# Patient Record
Sex: FEMALE | Race: BLACK OR AFRICAN AMERICAN | NOT HISPANIC OR LATINO | Employment: UNEMPLOYED | URBAN - METROPOLITAN AREA
[De-identification: names, ages, dates, MRNs, and addresses within clinical notes are randomized per-mention and may not be internally consistent; named-entity substitution may affect disease eponyms.]

---

## 2017-01-20 ENCOUNTER — HOSPITAL ENCOUNTER (EMERGENCY)
Facility: HOSPITAL | Age: 27
Discharge: HOME | End: 2017-01-20
Attending: EMERGENCY MEDICINE
Payer: OTHER GOVERNMENT

## 2017-01-20 VITALS
WEIGHT: 200 LBS | HEART RATE: 90 BPM | TEMPERATURE: 98 F | HEIGHT: 69 IN | OXYGEN SATURATION: 100 % | BODY MASS INDEX: 29.62 KG/M2 | SYSTOLIC BLOOD PRESSURE: 116 MMHG | RESPIRATION RATE: 19 BRPM | DIASTOLIC BLOOD PRESSURE: 55 MMHG

## 2017-01-20 DIAGNOSIS — J45.21 MILD INTERMITTENT ASTHMA WITH ACUTE EXACERBATION: Primary | ICD-10-CM

## 2017-01-20 LAB
B-HCG UR QL: NEGATIVE
CTP QC/QA: YES

## 2017-01-20 PROCEDURE — 63600175 PHARM REV CODE 636 W HCPCS: Performed by: NURSE PRACTITIONER

## 2017-01-20 PROCEDURE — 94644 CONT INHLJ TX 1ST HOUR: CPT

## 2017-01-20 PROCEDURE — 81025 URINE PREGNANCY TEST: CPT | Performed by: NURSE PRACTITIONER

## 2017-01-20 PROCEDURE — 25000242 PHARM REV CODE 250 ALT 637 W/ HCPCS: Performed by: NURSE PRACTITIONER

## 2017-01-20 PROCEDURE — 99284 EMERGENCY DEPT VISIT MOD MDM: CPT | Mod: 25

## 2017-01-20 RX ORDER — ALBUTEROL SULFATE 2.5 MG/.5ML
15 SOLUTION RESPIRATORY (INHALATION)
Status: COMPLETED | OUTPATIENT
Start: 2017-01-20 | End: 2017-01-20

## 2017-01-20 RX ORDER — ALBUTEROL SULFATE 0.83 MG/ML
2.5 SOLUTION RESPIRATORY (INHALATION) EVERY 6 HOURS PRN
Qty: 20 EACH | Refills: 0 | Status: SHIPPED | OUTPATIENT
Start: 2017-01-20 | End: 2018-01-20

## 2017-01-20 RX ORDER — PREDNISONE 20 MG/1
40 TABLET ORAL DAILY
Qty: 10 TABLET | Refills: 0 | Status: SHIPPED | OUTPATIENT
Start: 2017-01-20 | End: 2017-01-25

## 2017-01-20 RX ORDER — PREDNISONE 20 MG/1
60 TABLET ORAL
Status: COMPLETED | OUTPATIENT
Start: 2017-01-20 | End: 2017-01-20

## 2017-01-20 RX ORDER — IPRATROPIUM BROMIDE 0.5 MG/2.5ML
1 SOLUTION RESPIRATORY (INHALATION)
Status: COMPLETED | OUTPATIENT
Start: 2017-01-20 | End: 2017-01-20

## 2017-01-20 RX ORDER — ALBUTEROL SULFATE 90 UG/1
1-2 AEROSOL, METERED RESPIRATORY (INHALATION) EVERY 6 HOURS PRN
Qty: 1 INHALER | Refills: 0 | Status: SHIPPED | OUTPATIENT
Start: 2017-01-20 | End: 2018-01-20

## 2017-01-20 RX ADMIN — ALBUTEROL SULFATE 15 MG: 2.5 SOLUTION RESPIRATORY (INHALATION) at 03:01

## 2017-01-20 RX ADMIN — PREDNISONE 60 MG: 20 TABLET ORAL at 03:01

## 2017-01-20 RX ADMIN — IPRATROPIUM BROMIDE 1 MG: 0.5 SOLUTION RESPIRATORY (INHALATION) at 03:01

## 2017-01-20 NOTE — ED PROVIDER NOTES
Encounter Date: 1/20/2017       History     Chief Complaint   Patient presents with    Wheezing     since 8pm last night, reports chest tightness, aubible wheezing, has been out of medications >6 months      Review of patient's allergies indicates:  No Known Allergies  HPI Comments: Chief Complaint: Shortness of breath x6 hours    HPI: This is a 26-year-old female with history of asthma who presents to the ED with complaints of shortness of breath, chest tightness and wheezing for approximately 6 hours.  Patient reports she has not had an asthma exacerbation within the last year.  She has run out of her albuterol nebs.  Patient reports that she recently began smoking again.  Associated symptoms include dry cough.  She denies fever, productive cough.  No attempted treatment at home.  Symptoms are worse with exertion. She is currently breastfeeding.    The history is provided by the patient.     Past Medical History   Diagnosis Date    Asthma     Genital herpes      No past medical history pertinent negatives.  Past Surgical History   Procedure Laterality Date    Eagle tooth extraction       Family History   Problem Relation Age of Onset    Breast cancer Neg Hx     Colon cancer Neg Hx     Ovarian cancer Neg Hx      Social History   Substance Use Topics    Smoking status: Former Smoker    Smokeless tobacco: None    Alcohol use No     Review of Systems   Constitutional: Negative for chills and fever.   HENT: Negative for congestion and sore throat.    Respiratory: Positive for cough, chest tightness, shortness of breath and wheezing.    Cardiovascular: Negative for chest pain.   Gastrointestinal: Negative for abdominal pain, diarrhea, nausea and vomiting.   Genitourinary: Negative for dysuria.   Neurological: Negative for seizures, syncope and headaches.       Physical Exam   Initial Vitals   BP Pulse Resp Temp SpO2   01/20/17 0259 01/20/17 0259 01/20/17 0259 01/20/17 0259 01/20/17 0259   117/83 71 20 97.6 °F  (36.4 °C) 97 %     Physical Exam    Constitutional: Vital signs are normal. She appears well-developed and well-nourished.  Non-toxic appearance.   HENT:   Head: Normocephalic and atraumatic.   Mouth/Throat: Uvula is midline, oropharynx is clear and moist and mucous membranes are normal. No trismus in the jaw. No oropharyngeal exudate or posterior oropharyngeal erythema.   Eyes: EOM are normal.   Neck: Full passive range of motion without pain. Neck supple. No rigidity.   Cardiovascular: Normal rate, S1 normal, S2 normal and normal heart sounds. Exam reveals no gallop.    No murmur heard.  Pulmonary/Chest: Effort normal. No tachypnea. She has no decreased breath sounds. She has wheezes. She has no rhonchi. She has no rales.   Neurological: She is alert and oriented to person, place, and time. She has normal strength. Gait normal. GCS eye subscore is 4. GCS verbal subscore is 5. GCS motor subscore is 6.   Skin: Skin is warm and dry. No rash noted.         ED Course   Procedures  Labs Reviewed   POCT URINE PREGNANCY             Medical Decision Making:   ED Management:  This is a 26-year-old nonpregnant female who presents to the ED with complaints of shortness of breath and wheezing.  She is afebrile and well-appearing.  She is currently breast-feeding.  Vital signs are normal.  On exam, she is wheezing bilaterally and mildly tachypnic.  After receiving DuoNeb's and prednisone, she reports feeling much better and her breath sounds improved.  I considered but suspect a low probability for PE, pneumonia, ACS or other serious etiology.  I will treat for acute asthma exacerbation.  Discharged home with prescriptions for albuterol and prednisone. Instructions given for supportive care and follow-up.  Return precautions given.  Patient's case was discussed with Dr. Almaraz, who agrees with her plan of care.                   ED Course     Clinical Impression:   The encounter diagnosis was Mild intermittent asthma  with acute exacerbation.    Disposition:   Disposition: Discharged  Condition: Stable       Deanna Hong NP  01/20/17 0611

## 2017-01-20 NOTE — DISCHARGE INSTRUCTIONS
Please return to the ED for any new or worsening symptoms: chest pain, shortness of breath, loss of consciousness or any other concerns. Please follow up with primary care within in the week. You may also call [phone] for the [facility] same day appointment line.

## 2017-01-20 NOTE — ED TRIAGE NOTES
Pt presents to ED with c/o wheezing since 2100 last night. Pt reports that she has a hx of asthma and has not a flare up in over a year. She reports that she does not have inhaler at home. Pt reports being a smoker. No distress is noted. will continue to be monitored

## 2017-01-20 NOTE — ED AVS SNAPSHOT
[facility]  [place] [address]               Mica SNELL Justin   2017  3:10 AM   ED    Description:  Female : 1990   Department:  [facility]           Your Care was Coordinated By:     Provider Role From To    Priya Jones MD Attending Provider 17 --    Deanna Hong NP Nurse Practitioner 17 --      Reason for Visit     Wheezing           Diagnoses this Visit        Comments    Mild intermittent asthma with acute exacerbation    -  Primary       ED Disposition     None           To Do List           Follow-up Information     Schedule an appointment as soon as possible for a visit with Srinath Fonseca MD.    Specialty:  Family Medicine    Why:  Primary Care    Contact information:    [place] [address] [phone]         These Medications        Disp Refills Start End    albuterol 90 mcg/actuation inhaler 1 Inhaler 0 2017    Inhale 1-2 puffs into the lungs every 6 (six) hours as needed for Wheezing. - Inhalation    [facility] [place] [facility] [address] Ph #: [phone]       predniSONE (DELTASONE) 20 MG tablet 10 tablet 0 2017    Take 2 tablets (40 mg total) by mouth once daily. - Oral    [facility] [address] Ph #: [phone]       albuterol (PROVENTIL) 2.5 mg /3 mL (0.083 %) nebulizer solution 20 each 0 2017    Take 3 mLs (2.5 mg total) by nebulization every 6 (six) hours as needed for Wheezing. - Nebulization    [facility] [address] Ph #: [phone]         [facility] On [facility] -  Assistance  Registered nurses in the [facility] provide clinical advisement, health education, appointment booking, and other  advisory services.  Call for this free service at [phone].             Medications           Message regarding Medications     Verify the changes and/or additions to your medication regime listed below are the same as discussed with your clinician today.  If any of these changes or additions are incorrect, please notify your healthcare provider.        START taking these NEW medications        Refills    albuterol 90 mcg/actuation inhaler 0    Sig: Inhale 1-2 puffs into the lungs every 6 (six) hours as needed for Wheezing.    Class: Print    Route: Inhalation    predniSONE (DELTASONE) 20 MG tablet 0    Sig: Take 2 tablets (40 mg total) by mouth once daily.    Class: Print    Route: Oral    albuterol (PROVENTIL) 2.5 mg /3 mL (0.083 %) nebulizer solution 0    Sig: Take 3 mLs (2.5 mg total) by nebulization every 6 (six) hours as needed for Wheezing.    Class: Print    Route: Nebulization      These medications were administered today        Dose Freq    albuterol sulfate nebulizer solution 15 mg 15 mg ED 1 Time    Sig: Take 15 mg by nebulization ED 1 Time.    Class: Normal    Route: Nebulization    ipratropium 0.02 % nebulizer solution 1 mg 1 mg ED 1 Time    Sig: Take 5 mLs (1 mg total) by nebulization ED 1 Time.    Class: Normal    Route: Nebulization    predniSONE tablet 60 mg 60 mg ED 1 Time    Sig: Take 3 tablets (60 mg total) by mouth ED 1 Time.    Class: Normal    Route: Oral           Verify that the below list of medications is an accurate representation of the medications you are currently taking.  If none reported, the list may be blank. If incorrect, please contact your healthcare provider. Carry this list with you in case of emergency.           Current Medications     albuterol (PROVENTIL) 2.5 mg /3 mL (0.083 %) nebulizer solution Take 3 mLs (2.5 mg total) by nebulization every 6 (six) hours as needed for Wheezing.    albuterol 90 mcg/actuation inhaler Inhale 1-2 puffs into the lungs every 6 (six)  "hours as needed for Wheezing.    ALBUTEROL INHL Inhale into the lungs. Uses every 4 hours as needed.  Pt left inhaler at home    albuterol sulfate nebulizer solution 15 mg Take 15 mg by nebulization ED 1 Time.    ibuprofen (ADVIL,MOTRIN) 600 MG tablet Take 1 tablet (600 mg total) by mouth every 6 (six) hours.    ondansetron (ZOFRAN-ODT) 8 MG TbDL Take 1 tablet (8 mg total) by mouth every 12 (twelve) hours as needed.    oxycodone-acetaminophen (PERCOCET) 5-325 mg per tablet Take 1 tablet by mouth every 4 (four) hours as needed.    PNV1/IRON,CARBONYL/DOCUSATE/FA (PREN VIT COMB.1-IRON CB-FA-DSS ORAL) Take by mouth.    predniSONE (DELTASONE) 20 MG tablet Take 2 tablets (40 mg total) by mouth once daily.           Clinical Reference Information           Your Vitals Were     BP Pulse Temp Resp Height Weight    117/83 (BP Location: Right arm, Patient Position: Sitting) 98 97.6 °F (36.4 °C) (Oral) 20 5' 9" (1.753 m) 90.7 kg (200 lb)    SpO2 BMI             99% 29.53 kg/m2         Allergies as of 1/20/2017     No Known Allergies      Immunizations Administered on Date of Encounter - 1/20/2017     None      ED Micro, Lab, POCT     Start Ordered       Status Ordering Provider    01/20/17 0301 01/20/17 0300  POCT urine pregnancy  Once      Final result       ED Imaging Orders     None        Discharge Instructions       Please return to the ED for any new or worsening symptoms: chest pain, shortness of breath, loss of consciousness or any other concerns. Please follow up with primary care within in the week. You may also call [phone] for the [facility] same day appointment line.    Discharge References/Attachments     ASTHMA, ACUTE (ADULT) (ENGLISH)    ASTHMA, CONTROLLING YOUR (ENGLISH)      Smoking Cessation     If you would like to quit smoking:   You may be eligible for free services if you are a [place] resident and started smoking cigarettes before September 1, 1988.  Call the [employer]oking [employer]essation [employer] ([employer]) " toll free at (568) 471-0500 or (039) 370-2412.   Call 1-800-QUIT-NOW if you do not meet the above criteria.             [facility] complies with applicable Federal civil rights laws and does not discriminate on the basis of race, color, national origin, age, disability, or sex.        Language Assistance Services     ATTENTION: Language assistance services are available, free of charge. Please call [phone].      ATENCIÓN: Si habla español, tiene a alvarez disposición servicios gratuitos de asistencia lingüística. Llame al [phone].     CHÚ Ý: N?u b?n nói Ti?ng Vi?t, có các d?ch v? h? tr? ngôn ng? mi?n phí dành cho b?n. G?i s? [phone].

## 2017-08-26 ENCOUNTER — HOSPITAL ENCOUNTER (EMERGENCY)
Facility: HOSPITAL | Age: 27
Discharge: LEFT BEFORE DISCHARGE | End: 2017-08-26
Attending: EMERGENCY MEDICINE
Payer: OTHER GOVERNMENT

## 2017-08-26 VITALS
WEIGHT: 160 LBS | HEART RATE: 64 BPM | HEIGHT: 69 IN | BODY MASS INDEX: 23.7 KG/M2 | SYSTOLIC BLOOD PRESSURE: 130 MMHG | DIASTOLIC BLOOD PRESSURE: 65 MMHG | OXYGEN SATURATION: 100 % | RESPIRATION RATE: 20 BRPM | TEMPERATURE: 99 F

## 2017-08-26 DIAGNOSIS — Z53.29 LEFT AGAINST MEDICAL ADVICE: ICD-10-CM

## 2017-08-26 DIAGNOSIS — O20.0 THREATENED MISCARRIAGE IN EARLY PREGNANCY: Primary | ICD-10-CM

## 2017-08-26 LAB
ALBUMIN SERPL BCP-MCNC: 4.1 G/DL
ALP SERPL-CCNC: 141 U/L
ALT SERPL W/O P-5'-P-CCNC: 10 U/L
ANION GAP SERPL CALC-SCNC: 12 MMOL/L
AST SERPL-CCNC: 19 U/L
B-HCG UR QL: POSITIVE
BACTERIA GENITAL QL WET PREP: ABNORMAL
BASOPHILS # BLD AUTO: 0.03 K/UL
BASOPHILS NFR BLD: 0.6 %
BILIRUB SERPL-MCNC: 0.9 MG/DL
BILIRUB UR QL STRIP: NEGATIVE
BUN SERPL-MCNC: 9 MG/DL
CALCIUM SERPL-MCNC: 9.6 MG/DL
CHLORIDE SERPL-SCNC: 106 MMOL/L
CLARITY UR: CLEAR
CLUE CELLS VAG QL WET PREP: ABNORMAL
CO2 SERPL-SCNC: 22 MMOL/L
COLOR UR: YELLOW
CREAT SERPL-MCNC: 0.8 MG/DL
CTP QC/QA: YES
DIFFERENTIAL METHOD: ABNORMAL
EOSINOPHIL # BLD AUTO: 0.2 K/UL
EOSINOPHIL NFR BLD: 3.8 %
ERYTHROCYTE [DISTWIDTH] IN BLOOD BY AUTOMATED COUNT: 14.3 %
EST. GFR  (AFRICAN AMERICAN): >60 ML/MIN/1.73 M^2
EST. GFR  (NON AFRICAN AMERICAN): >60 ML/MIN/1.73 M^2
FILAMENT FUNGI VAG WET PREP-#/AREA: ABNORMAL
GLUCOSE SERPL-MCNC: 86 MG/DL
GLUCOSE UR QL STRIP: NEGATIVE
HCG INTACT+B SERPL-ACNC: NORMAL MIU/ML
HCT VFR BLD AUTO: 38.1 %
HGB BLD-MCNC: 13.6 G/DL
HGB UR QL STRIP: NEGATIVE
KETONES UR QL STRIP: ABNORMAL
LEUKOCYTE ESTERASE UR QL STRIP: NEGATIVE
LYMPHOCYTES # BLD AUTO: 1.7 K/UL
LYMPHOCYTES NFR BLD: 32.7 %
MCH RBC QN AUTO: 27.7 PG
MCHC RBC AUTO-ENTMCNC: 35.7 G/DL
MCV RBC AUTO: 78 FL
MONOCYTES # BLD AUTO: 0.4 K/UL
MONOCYTES NFR BLD: 6.8 %
NEUTROPHILS # BLD AUTO: 3 K/UL
NEUTROPHILS NFR BLD: 55.9 %
NITRITE UR QL STRIP: NEGATIVE
PH UR STRIP: 5 [PH] (ref 5–8)
PLATELET # BLD AUTO: 286 K/UL
PMV BLD AUTO: 10.9 FL
POTASSIUM SERPL-SCNC: 3.9 MMOL/L
PROT SERPL-MCNC: 7.4 G/DL
PROT UR QL STRIP: NEGATIVE
RBC # BLD AUTO: 4.91 M/UL
SODIUM SERPL-SCNC: 140 MMOL/L
SP GR UR STRIP: 1.03 (ref 1–1.03)
SPECIMEN SOURCE: ABNORMAL
T VAGINALIS GENITAL QL WET PREP: ABNORMAL
URN SPEC COLLECT METH UR: ABNORMAL
UROBILINOGEN UR STRIP-ACNC: ABNORMAL EU/DL
WBC # BLD AUTO: 5.32 K/UL
WBC #/AREA VAG WET PREP: ABNORMAL
YEAST GENITAL QL WET PREP: ABNORMAL

## 2017-08-26 PROCEDURE — 85025 COMPLETE CBC W/AUTO DIFF WBC: CPT

## 2017-08-26 PROCEDURE — 84702 CHORIONIC GONADOTROPIN TEST: CPT

## 2017-08-26 PROCEDURE — 63600175 PHARM REV CODE 636 W HCPCS: Performed by: PHYSICIAN ASSISTANT

## 2017-08-26 PROCEDURE — 87210 SMEAR WET MOUNT SALINE/INK: CPT

## 2017-08-26 PROCEDURE — 99284 EMERGENCY DEPT VISIT MOD MDM: CPT | Mod: 25

## 2017-08-26 PROCEDURE — 81003 URINALYSIS AUTO W/O SCOPE: CPT

## 2017-08-26 PROCEDURE — 96361 HYDRATE IV INFUSION ADD-ON: CPT

## 2017-08-26 PROCEDURE — 81025 URINE PREGNANCY TEST: CPT | Performed by: PHYSICIAN ASSISTANT

## 2017-08-26 PROCEDURE — 87086 URINE CULTURE/COLONY COUNT: CPT

## 2017-08-26 PROCEDURE — 96374 THER/PROPH/DIAG INJ IV PUSH: CPT

## 2017-08-26 PROCEDURE — 87591 N.GONORRHOEAE DNA AMP PROB: CPT

## 2017-08-26 PROCEDURE — 80053 COMPREHEN METABOLIC PANEL: CPT

## 2017-08-26 PROCEDURE — 25000003 PHARM REV CODE 250: Performed by: PHYSICIAN ASSISTANT

## 2017-08-26 PROCEDURE — 96375 TX/PRO/DX INJ NEW DRUG ADDON: CPT

## 2017-08-26 RX ORDER — ONDANSETRON 4 MG/1
8 TABLET, FILM COATED ORAL EVERY 12 HOURS PRN
Qty: 12 TABLET | Refills: 0 | Status: SHIPPED | OUTPATIENT
Start: 2017-08-26

## 2017-08-26 RX ORDER — ACETAMINOPHEN 325 MG/1
650 TABLET ORAL
Status: COMPLETED | OUTPATIENT
Start: 2017-08-26 | End: 2017-08-26

## 2017-08-26 RX ORDER — METOCLOPRAMIDE HYDROCHLORIDE 5 MG/ML
10 INJECTION INTRAMUSCULAR; INTRAVENOUS
Status: COMPLETED | OUTPATIENT
Start: 2017-08-26 | End: 2017-08-26

## 2017-08-26 RX ORDER — ONDANSETRON 2 MG/ML
8 INJECTION INTRAMUSCULAR; INTRAVENOUS
Status: COMPLETED | OUTPATIENT
Start: 2017-08-26 | End: 2017-08-26

## 2017-08-26 RX ADMIN — ACETAMINOPHEN 650 MG: 325 TABLET, FILM COATED ORAL at 10:08

## 2017-08-26 RX ADMIN — METOCLOPRAMIDE 10 MG: 5 INJECTION, SOLUTION INTRAMUSCULAR; INTRAVENOUS at 12:08

## 2017-08-26 RX ADMIN — SODIUM CHLORIDE 1000 ML: 0.9 INJECTION, SOLUTION INTRAVENOUS at 10:08

## 2017-08-26 RX ADMIN — ONDANSETRON 8 MG: 2 INJECTION INTRAMUSCULAR; INTRAVENOUS at 10:08

## 2017-08-26 NOTE — ED PROVIDER NOTES
"Encounter Date: 2017       History     Chief Complaint   Patient presents with    Headache     Recent positive pregnancy test two weeks ago and constant headaches, slight vaginal bleeding "with wiping".     Emesis     26-year-old female, A0 0, 6 weeks 4 days pregnant by last menstrual period of , no past medical history, presents to emergency department for 5 day course of nausea, vomiting, right temporal headache, and vaginal bleeding.  Notes 4-5 episodes of emesis per day and is not tolerating by mouth.  Vaginal bleeding is only spotting noted with wiping and is not having to use pads or tampons.  Denies abdominal pain.  No attempted treatment for headaches or nausea.  Patient notes relief with taking Diclegis prior pregnancy.  No visual disturbance, dental pain, fever, or otalgia.  Appointment with Dr. Vilchis next week.          Review of patient's allergies indicates:  No Known Allergies  Past Medical History:   Diagnosis Date    Asthma     Genital herpes      Past Surgical History:   Procedure Laterality Date     SECTION      WISDOM TOOTH EXTRACTION       Family History   Problem Relation Age of Onset    Breast cancer Neg Hx     Colon cancer Neg Hx     Ovarian cancer Neg Hx      Social History   Substance Use Topics    Smoking status: Former Smoker    Smokeless tobacco: Never Used    Alcohol use No     Review of Systems   Constitutional: Negative for fever.   HENT: Negative for sore throat.    Eyes: Negative for visual disturbance.   Respiratory: Negative for cough and shortness of breath.    Cardiovascular: Negative for chest pain.   Gastrointestinal: Positive for nausea and vomiting. Negative for abdominal pain.   Genitourinary: Negative for dysuria, frequency, pelvic pain, urgency, vaginal bleeding and vaginal discharge.   Musculoskeletal: Negative for back pain.   Skin: Negative for rash.   Neurological: Positive for headaches. Negative for dizziness, seizures and syncope. "   All other systems reviewed and are negative.      Physical Exam     Initial Vitals [08/26/17 0941]   BP Pulse Resp Temp SpO2   134/78 88 17 98.7 °F (37.1 °C) 100 %      MAP       96.67         Physical Exam    Nursing note and vitals reviewed.  Constitutional: She appears well-developed and well-nourished. She is not diaphoretic. No distress.   HENT:   Head: Normocephalic and atraumatic.   Nose: Nose normal.   Mouth/Throat: Oropharynx is clear and moist.   Eyes: Conjunctivae and EOM are normal. Right eye exhibits no discharge. Left eye exhibits no discharge.   Neck: Normal range of motion. No tracheal deviation present. No JVD present.   Cardiovascular: Normal rate, regular rhythm and normal heart sounds. Exam reveals no friction rub.    No murmur heard.  Pulmonary/Chest: Breath sounds normal. No stridor. No respiratory distress. She has no wheezes. She has no rhonchi. She has no rales. She exhibits no tenderness.   Abdominal: Soft. She exhibits no distension. There is no tenderness. There is no rigidity, no rebound, no guarding, no CVA tenderness, no tenderness at McBurney's point and negative Espinal's sign.   Genitourinary: Pelvic exam was performed with patient supine. There is no rash, tenderness or lesion on the right labia. There is no rash, tenderness or lesion on the left labia. Cervix exhibits no motion tenderness, no discharge and no friability. Right adnexum displays no mass and no tenderness. Left adnexum displays no mass and no tenderness. No erythema, tenderness or bleeding in the vagina. No foreign body in the vagina. No signs of injury around the vagina. Vaginal discharge (scant thin white) found.   Genitourinary Comments: Os closed   Musculoskeletal: Normal range of motion.   Neurological: She is alert and oriented to person, place, and time.   Skin: Skin is warm and dry. No rash and no abscess noted. No erythema. No pallor.         ED Course   Procedures  Labs Reviewed   CBC W/ AUTO DIFFERENTIAL  - Abnormal; Notable for the following:        Result Value    MCV 78 (*)     All other components within normal limits   COMPREHENSIVE METABOLIC PANEL - Abnormal; Notable for the following:     CO2 22 (*)     Alkaline Phosphatase 141 (*)     All other components within normal limits   URINALYSIS - Abnormal; Notable for the following:     Ketones, UA 1+ (*)     Urobilinogen, UA 2.0-3.0 (*)     All other components within normal limits   VAGINAL SCREEN - Abnormal; Notable for the following:     Clue Cells, Wet Prep Few (*)     WBC - Vaginal Screen Few (*)     Bacteria - Vaginal Screen Few (*)     All other components within normal limits   POCT URINE PREGNANCY - Abnormal; Notable for the following:     POC Preg Test, Ur Positive (*)     All other components within normal limits   C. TRACHOMATIS/N. GONORRHOEAE BY AMP DNA   CULTURE, URINE   HCG, QUANTITATIVE, PREGNANCY             Medical Decision Making:   History:   Old Medical Records: I decided to obtain old medical records.      This is an emergent evaluation of a 26 y.o. female, A0, 6 weeks pregnant (by LMP), with no PMHx presenting to the ED for vaginal bleeding associated with HA and emesis. Denies abdominal pain and weakness. Vitals WNL, afebrile. Patient is non-toxic appearing and in no acute distress. Pelvic exam shows os closed. UPT positive. ABO A (+). Beta-hCG 12,751 today, with no previous value to compare to. CBC and CMP reveal no evidence to suggest HELLP syndrome in this first trimester patient. UA unremarkable. Vaginal screen unremarkable. GC pending.     Patient decides to leave [place] before US can be performed. Patient does not want to wait. Patient likely has threatened miscarriage, but EP cannot be excluded without US. Patient understands risks and understands she may return at anytime to continue evaluation for acute surgical process. I have also considered but doubt and UTI/pyelonephritis, ovarian torsion, ovarian cyst rupture, appendicitis,  PID, and salpingitis.     I discussed this patient with Dr. Mo who is in agreement with my assessment and plan.                 ED Course     Clinical Impression:   The primary encounter diagnosis was Threatened miscarriage in early pregnancy. A diagnosis of Left against medical advice was also pertinent to this visit.    Disposition:   Disposition: AMA  Condition: Fair                        Manohar Davis PA-C  08/26/17 1514

## 2017-08-26 NOTE — ED NOTES
Spoke with one of the Ultrasound techs. Stated the US tech to perform her ultrasound is with a patient, and it may be another 30-40 minutes before she comes to get her

## 2017-08-28 LAB
BACTERIA UR CULT: NORMAL
C TRACH DNA SPEC QL NAA+PROBE: NOT DETECTED
N GONORRHOEA DNA SPEC QL NAA+PROBE: NOT DETECTED

## 2017-09-16 ENCOUNTER — HOSPITAL ENCOUNTER (EMERGENCY)
Facility: HOSPITAL | Age: 27
Discharge: HOME | End: 2017-09-16
Attending: EMERGENCY MEDICINE
Payer: OTHER GOVERNMENT

## 2017-09-16 VITALS
RESPIRATION RATE: 18 BRPM | BODY MASS INDEX: 31.4 KG/M2 | DIASTOLIC BLOOD PRESSURE: 82 MMHG | HEIGHT: 69 IN | HEART RATE: 88 BPM | WEIGHT: 212 LBS | SYSTOLIC BLOOD PRESSURE: 128 MMHG | TEMPERATURE: 98 F | OXYGEN SATURATION: 99 %

## 2017-09-16 DIAGNOSIS — M54.42 ACUTE BILATERAL LOW BACK PAIN WITH BILATERAL SCIATICA: ICD-10-CM

## 2017-09-16 DIAGNOSIS — Z87.81 HISTORY OF FRACTURE: ICD-10-CM

## 2017-09-16 DIAGNOSIS — Z3A.01 LESS THAN 8 WEEKS GESTATION OF PREGNANCY: Primary | ICD-10-CM

## 2017-09-16 DIAGNOSIS — M54.41 ACUTE BILATERAL LOW BACK PAIN WITH BILATERAL SCIATICA: ICD-10-CM

## 2017-09-16 PROCEDURE — 25000003 PHARM REV CODE 250: Performed by: PHYSICIAN ASSISTANT

## 2017-09-16 PROCEDURE — 99283 EMERGENCY DEPT VISIT LOW MDM: CPT

## 2017-09-16 RX ORDER — ACETAMINOPHEN 325 MG/1
650 TABLET ORAL EVERY 6 HOURS PRN
Qty: 12 TABLET | Refills: 0 | Status: SHIPPED | OUTPATIENT
Start: 2017-09-16 | End: 2017-09-19

## 2017-09-16 RX ORDER — ACETAMINOPHEN 325 MG/1
650 TABLET ORAL
Status: COMPLETED | OUTPATIENT
Start: 2017-09-16 | End: 2017-09-16

## 2017-09-16 RX ADMIN — ACETAMINOPHEN 650 MG: 325 TABLET ORAL at 07:09
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
## 2017-09-17 NOTE — ED TRIAGE NOTES
Pt. Comes to the ER with lower back pain. Patient states she is approx 9 weeks ago. Patient states feels like something sitting on nerves.

## 2017-10-05 ENCOUNTER — HOSPITAL ENCOUNTER (EMERGENCY)
Facility: OTHER | Age: 27
Discharge: HOME | End: 2017-10-05
Attending: EMERGENCY MEDICINE
Payer: OTHER GOVERNMENT

## 2017-10-05 VITALS
HEART RATE: 70 BPM | WEIGHT: 163 LBS | SYSTOLIC BLOOD PRESSURE: 112 MMHG | DIASTOLIC BLOOD PRESSURE: 61 MMHG | RESPIRATION RATE: 16 BRPM | OXYGEN SATURATION: 96 % | BODY MASS INDEX: 24.14 KG/M2 | HEIGHT: 69 IN | TEMPERATURE: 98 F

## 2017-10-05 DIAGNOSIS — O02.89 NONVIABLE PREGNANCY: ICD-10-CM

## 2017-10-05 DIAGNOSIS — O26.91 ABNORMAL PREGNANCY IN FIRST TRIMESTER: ICD-10-CM

## 2017-10-05 DIAGNOSIS — R11.2 NON-INTRACTABLE VOMITING WITH NAUSEA, UNSPECIFIED VOMITING TYPE: Primary | ICD-10-CM

## 2017-10-05 DIAGNOSIS — E86.0 DEHYDRATION: ICD-10-CM

## 2017-10-05 LAB
ANION GAP SERPL CALC-SCNC: 12 MMOL/L
B-HCG UR QL: POSITIVE
BACTERIA #/AREA URNS HPF: NORMAL /HPF
BASOPHILS # BLD AUTO: 0.02 K/UL
BASOPHILS NFR BLD: 0.3 %
BILIRUB UR QL STRIP: NEGATIVE
BUN SERPL-MCNC: 9 MG/DL
CALCIUM SERPL-MCNC: 9.8 MG/DL
CHLORIDE SERPL-SCNC: 106 MMOL/L
CLARITY UR: CLEAR
CO2 SERPL-SCNC: 19 MMOL/L
COLOR UR: YELLOW
CREAT SERPL-MCNC: 0.7 MG/DL
CTP QC/QA: YES
DIFFERENTIAL METHOD: ABNORMAL
EOSINOPHIL # BLD AUTO: 0.1 K/UL
EOSINOPHIL NFR BLD: 1.5 %
ERYTHROCYTE [DISTWIDTH] IN BLOOD BY AUTOMATED COUNT: 14.2 %
EST. GFR  (AFRICAN AMERICAN): >60 ML/MIN/1.73 M^2
EST. GFR  (NON AFRICAN AMERICAN): >60 ML/MIN/1.73 M^2
GLUCOSE SERPL-MCNC: 82 MG/DL
GLUCOSE UR QL STRIP: NEGATIVE
HCG INTACT+B SERPL-ACNC: NORMAL MIU/ML
HCT VFR BLD AUTO: 37.8 %
HGB BLD-MCNC: 12.9 G/DL
HGB UR QL STRIP: ABNORMAL
KETONES UR QL STRIP: ABNORMAL
LEUKOCYTE ESTERASE UR QL STRIP: NEGATIVE
LYMPHOCYTES # BLD AUTO: 1.8 K/UL
LYMPHOCYTES NFR BLD: 24.9 %
MCH RBC QN AUTO: 27.5 PG
MCHC RBC AUTO-ENTMCNC: 34.1 G/DL
MCV RBC AUTO: 81 FL
MICROSCOPIC COMMENT: NORMAL
MONOCYTES # BLD AUTO: 0.4 K/UL
MONOCYTES NFR BLD: 5.2 %
NEUTROPHILS # BLD AUTO: 4.9 K/UL
NEUTROPHILS NFR BLD: 68 %
NITRITE UR QL STRIP: NEGATIVE
PH UR STRIP: 6 [PH] (ref 5–8)
PLATELET # BLD AUTO: 280 K/UL
PMV BLD AUTO: 10.4 FL
POTASSIUM SERPL-SCNC: 3.6 MMOL/L
PROT UR QL STRIP: ABNORMAL
RBC # BLD AUTO: 4.69 M/UL
RBC #/AREA URNS HPF: 3 /HPF (ref 0–4)
SODIUM SERPL-SCNC: 137 MMOL/L
SP GR UR STRIP: >=1.03 (ref 1–1.03)
SQUAMOUS #/AREA URNS HPF: 3 /HPF
URN SPEC COLLECT METH UR: ABNORMAL
UROBILINOGEN UR STRIP-ACNC: NEGATIVE EU/DL
WBC # BLD AUTO: 7.27 K/UL
WBC #/AREA URNS HPF: 1 /HPF (ref 0–5)

## 2017-10-05 PROCEDURE — 81000 URINALYSIS NONAUTO W/SCOPE: CPT

## 2017-10-05 PROCEDURE — 96375 TX/PRO/DX INJ NEW DRUG ADDON: CPT

## 2017-10-05 PROCEDURE — 85025 COMPLETE CBC W/AUTO DIFF WBC: CPT

## 2017-10-05 PROCEDURE — 99284 EMERGENCY DEPT VISIT MOD MDM: CPT | Mod: 25

## 2017-10-05 PROCEDURE — 81025 URINE PREGNANCY TEST: CPT | Performed by: EMERGENCY MEDICINE

## 2017-10-05 PROCEDURE — 63600175 PHARM REV CODE 636 W HCPCS: Performed by: PHYSICIAN ASSISTANT

## 2017-10-05 PROCEDURE — 96374 THER/PROPH/DIAG INJ IV PUSH: CPT

## 2017-10-05 PROCEDURE — 25000003 PHARM REV CODE 250: Performed by: PHYSICIAN ASSISTANT

## 2017-10-05 PROCEDURE — 84702 CHORIONIC GONADOTROPIN TEST: CPT

## 2017-10-05 PROCEDURE — 80048 BASIC METABOLIC PNL TOTAL CA: CPT

## 2017-10-05 PROCEDURE — 99243 OFF/OP CNSLTJ NEW/EST LOW 30: CPT | Mod: ,,, | Performed by: OBSTETRICS & GYNECOLOGY

## 2017-10-05 PROCEDURE — S0028 INJECTION, FAMOTIDINE, 20 MG: HCPCS | Performed by: PHYSICIAN ASSISTANT

## 2017-10-05 RX ORDER — METOCLOPRAMIDE HYDROCHLORIDE 5 MG/ML
10 INJECTION INTRAMUSCULAR; INTRAVENOUS
Status: COMPLETED | OUTPATIENT
Start: 2017-10-05 | End: 2017-10-05

## 2017-10-05 RX ORDER — IBUPROFEN 600 MG/1
600 TABLET ORAL EVERY 6 HOURS PRN
Qty: 30 TABLET | Refills: 0 | Status: SHIPPED | OUTPATIENT
Start: 2017-10-05 | End: 2017-10-10 | Stop reason: CLARIF

## 2017-10-05 RX ORDER — MISOPROSTOL 200 UG/1
800 TABLET ORAL EVERY 6 HOURS
Qty: 16 TABLET | Refills: 0 | Status: SHIPPED | OUTPATIENT
Start: 2017-10-05 | End: 2017-10-10

## 2017-10-05 RX ORDER — FAMOTIDINE 10 MG/ML
20 INJECTION INTRAVENOUS
Status: COMPLETED | OUTPATIENT
Start: 2017-10-05 | End: 2017-10-05

## 2017-10-05 RX ADMIN — SODIUM CHLORIDE 1000 ML: 0.9 INJECTION, SOLUTION INTRAVENOUS at 07:10

## 2017-10-05 RX ADMIN — FAMOTIDINE 20 MG: 10 INJECTION INTRAVENOUS at 07:10

## 2017-10-05 RX ADMIN — METOCLOPRAMIDE 10 MG: 5 INJECTION, SOLUTION INTRAMUSCULAR; INTRAVENOUS at 07:10

## 2017-10-05 NOTE — ED NOTES
Two patient identifiers have been checked and are correct.      Appearance: Pt awake, alert & oriented to person, place & time. Pt in no acute distress at present time. Pt is clean and well groomed with clothes appropriately fastened.   Skin: Skin warm, dry & intact. Color consistent with ethnicity. Mucous membranes moist. No breakdown or brusing noted.   Musculoskeletal: Patient moving all extremities well, no obvious swelling or deformities noted.   Respiratory: Respirations spontaneous, even, and non-labored. Visible chest rise noted. Airway is open and patent. No accessory muscle use noted.   Neurologic: Sensation is intact. Speech is clear and appropriate. Eyes open spontaneously, behavior appropriate to situation, follows commands, facial expression symmetrical, bilateral hand grasp equal and even, purposeful motor response noted. Denies HA.  Cardiac: All peripheral pulses present. No Bilateral lower extremity edema. Cap refill is <3 seconds. Denies CP.  Abdomen: Abdomen soft, non-tender to palpation. Reports NV. Mild diarrhea.   : Pt reports no dysuria or hematuria.

## 2017-10-05 NOTE — ED TRIAGE NOTES
Mica Anderson, a 26 y.o. female presents to the ED with complaints of possibly being pregnant. Pt states she went to get US a few weeks ago and they did not see a baby and told her to expect a miscarriage. Pt states she is still having pregnancy symptoms daily. Pt reports some spotting about a week ago. Pt states she wants to make sure everything is alright if she is in fact pregnant.       Chief Complaint   Patient presents with    Emesis     pt c/o vomiting all day and not being able to keep anything down, pt reports she is apx 12 wks pregnant      Review of patient's allergies indicates:  No Known Allergies  Past Medical History:   Diagnosis Date    Asthma     Genital herpes

## 2017-10-06 NOTE — CONSULTS
[facility]  Obstetrics  Consult Note    Patient Name: Mica Leonardo  MRN: [id number]  Admission Date: 10/5/2017  Hospital Length of Stay: 0 days  Code Status: Prior  Primary Care Provider: [facility]  Principal Problem: Nonviable pregnancy    Inpatient consult to Obstetrics / Gynecology  Consult performed by: BHASKAR BARGER  Consult ordered by: AZEEM PERRY  Reason for consult: nonviable pregnancy        Subjective:     Principal Problem:Nonviable pregnancy    History of Present Illness:  27 yo  at approx 10w0d who presents with nausea and vomiting. States she has had this throughout pregnancy, severity is about at baseline, although did not eat much today which is why she came in. Taking zofran at home. N/V resolved in ED with reglan and pepcid. Also given IV lfuids, states she is feeling much better, has an appetite, and feels like she could eat.     Has been following with Dr. Chris Vilchis this pregnancy, previous ultrasounds in clinic show gestational sac but no fetal pole. Today, TVUS in ED shows gestational sac 6w5d with no fetal pole. bHCG decreased from 49,428 to 39, 444. OBGYN consulted for management of nonviable pregnancy.     Patient denies any abdominal pain or cramping. Very light spotting over the past few days, using 1 panty liner per day. No heavy VB or clots. States Dr. Vilchis had discussed options with her in case of miscarriage. Initially desired expectant management but it has been 2 weeks since initial abnormal US and nothing has happened. She now desires further management options.      Obstetric History       T0      L1     SAB0   TAB0   Ectopic0   Multiple0   Live Births1       # Outcome Date GA Lbr Armando/2nd Weight Sex Delivery Anes PTL Lv   3 Current            2  10/09/16 31w3d  1.726 kg (3 lb 12.9 oz) M CS-LTranv EPI Y GURMEET      Name: ADRIANA LEONARDO      Complications: Abruptio Placenta      Apgar1:   7                Apgar5: 8   1 AB                 Past Medical History:   Diagnosis Date    Asthma     Genital herpes      Past Surgical History:   Procedure Laterality Date     SECTION      WISDOM TOOTH EXTRACTION           (Not in a hospital admission)    Review of patient's allergies indicates:  No Known Allergies     Family History     None        Social History Main Topics    Smoking status: Former Smoker    Smokeless tobacco: Never Used    Alcohol use No    Drug use: No    Sexual activity: Yes     Partners: Male     Birth control/ protection: None      Comment: - Da      Review of Systems   Constitutional: Negative for chills and fever.   Eyes: Negative for visual disturbance.   Respiratory: Negative for shortness of breath.    Cardiovascular: Negative for chest pain and palpitations.   Gastrointestinal: Positive for nausea and vomiting. Negative for abdominal pain.   Genitourinary: Positive for vaginal bleeding. Negative for vaginal discharge and vaginal odor.   Musculoskeletal: Negative for back pain.   Neurological: Negative for seizures, syncope and headaches.   Hematological: Does not bruise/bleed easily.   Psychiatric/Behavioral: The patient is not nervous/anxious.       Objective:     Vital Signs (Most Recent):  Temp: 98.4 °F (36.9 °C) (10/05/17 2236)  Pulse: 70 (10/05/17 2135)  Resp: 16 (10/05/17 1839)  BP: 112/61 (10/05/17 2135)  SpO2: 96 % (10/05/17 2135) Vital Signs (24h Range):  Temp:  [98 °F (36.7 °C)-98.4 °F (36.9 °C)] 98.4 °F (36.9 °C)  Pulse:  [70-79] 70  Resp:  [16] 16  SpO2:  [96 %-100 %] 96 %  BP: (112-142)/(61-69) 112/61     Weight: 73.9 kg (163 lb)  Body mass index is 24.07 kg/m².    Physical Exam:   Constitutional: She is oriented to person, place, and time. She appears well-developed and well-nourished. No distress.       Cardiovascular: Normal rate and regular rhythm.     Pulmonary/Chest: Effort normal. No respiratory distress.        Abdominal: Soft. She  exhibits no distension. There is no tenderness. There is no rebound and no guarding.             Musculoskeletal: Moves all extremeties. She exhibits no edema.       Neurological: She is alert and oriented to person, place, and time.    Skin: Skin is warm and dry.    Psychiatric: She has a normal mood and affect. Her behavior is normal.     Significant Labs:      Recent Labs  Lab 10/05/17  1950   WBC 7.27   HGB 12.9   HCT 37.8   MCV 81*           Recent Labs  Lab 10/05/17  1950      K 3.6      CO2 19*   BUN 9   CREATININE 0.7   GLU 82     bHCG 60927 today    A POS    UA with trace protein and blood, 3+ ketones    Imaging:  Imaging Results          US OB Less Than 14 Wks with Transvag(xpd (Final result)  Result time 10/05/17 19:56:56    Final result by Mila Courtney MD (10/05/17 19:56:56)                 Impression:      1.  Fluid collection within the endometrium, with suggestion of minimal surrounding trophoblastic reaction and probable intraluminal yolk sac however no fetal pole at this time.  If this indeed reflects a gestational sac, mean gestational sac diameter correlates with an estimated gestational age of 6 weeks 5 days.  Given the appearance, examination is consistent with pregnancy of unknown viability, early pregnancy failure is not excluded, followup beta-hCG and/or ultrasound is recommended.  Please note, there is a mild to moderate subchorionic hemorrhage.          Electronically signed by: MILA COURTNEY MD  Date:     10/05/17  Time:    19:56              Narrative:    Ultrasound OB less than 14 weeks with transvaginal    Clinical history: Vaginal bleeding    Comparison: None    Technique:  Real-time sonography was performed of the pelvis using transabdominal and transvaginal technique.    Findings:      There is a low attenuating fluid collection with suggested minimal surrounding trophoblastic reaction within the uterus, mean gestational sac diameter would correlate with an  estimated gestational age of 6 weeks 5 days.  On cine imaging there is what appears to be a thin walled yolk sac although no convincing fetal pole at this time.  There is a mild to moderate subchorionic hemorrhage.  There is a mild amount of fluid within the cervix.  The right ovary measures approximately 1.7 x 2.1 x 3.6 cm, and contains a probable corpus luteal cyst measuring 1.6 x 1.3 x 1.5 cm.  The left ovary measures approximately 1.1 x 1.8 x 1.9 cm.  Arterial and venous flow is documented to the ovaries bilaterally.  No significant free fluid in the pelvis.                                Assessment/Plan:     26 y.o. female  at 10w0d for:    * Nonviable pregnancy    - gestational sac on US with no fetal pole for multiple US now, bHCG now with decreasing trend  - management options discussed including risks/benefits of each   Continued expectant management   Medical management with cytotec   Surgical management with suction D&C. Clinically stable, would be scheduled outpatient.  - Patient desires cytotec. Instructions for taking, side effects, and what to expect discussed in detail. All questions answered. Rx given for cytotec 800 mcg PO q6 until pregnancy passes, up to 4 doses.  - ibuprofen Rx given for pain/cramping control  - importance of follow up within 1 week, weekly bHCG monitoring, and contraception until bHCG negative were all stressed. Patient desires follow up with us, information for [facility] provided in discharge papers.  - instructed on bleeding/infection precautions and pelvic rest at least 2 weeks after pregnancy passes        Nausea/vomiting in pregnancy    - symptoms improved with antiemetics and fluids in ED  - patient feels better and that she can tolerate PO  - recommend small frequent meals and PO hydration as tolerated  - can continue antiemetics at home PRN          Plan was discussed with staff Dr. Cao who agrees with above.    Thank you for your consult. I will sign  off. Please contact us if you have any additional questions.    Gudelia Gutierrez MD  Obstetrics & Gynecology  [facility]

## 2017-10-06 NOTE — HPI
25 yo  at approx 10w0d who presents with nausea and vomiting. States she has had this throughout pregnancy, severity is about at baseline, although did not eat much today which is why she came in. Taking zofran at home. N/V resolved in ED with reglan and pepcid. Also given IV lfuids, states she is feeling much better, has an appetite, and feels like she could eat.     Has been following with Dr. Chris Vilchis this pregnancy, previous ultrasounds in clinic show gestational sac but no fetal pole. Today, TVUS in ED shows gestational sac 6w5d with no fetal pole. bHCG decreased from 49,428 to 39, 444. OBGYN consulted for management of nonviable pregnancy.     Patient denies any abdominal pain or cramping. Very light spotting over the past few days, using 1 panty liner per day. No heavy VB or clots. States Dr. Vilchis had discussed options with her in case of miscarriage. Initially desired expectant management but it has been 2 weeks since initial abnormal US and nothing has happened. She now desires further management options.

## 2017-10-06 NOTE — ASSESSMENT & PLAN NOTE
- gestational sac on US with no fetal pole for multiple US now, bHCG now with decreasing trend  - management options discussed including risks/benefits of each   Continued expectant management   Medical management with cytotec   Surgical management with suction D&C. Clinically stable, would be scheduled outpatient.  - Patient desires cytotec. Instructions for taking, side effects, and what to expect discussed in detail. All questions answered. Rx given for cytotec 800 mcg PO q6 until pregnancy passes, up to 4 doses.  - ibuprofen Rx given for pain/cramping control  - importance of follow up within 1 week, weekly bHCG monitoring, and contraception until bHCG negative were all stressed. Patient desires follow up with us, information for [facility] provided in discharge papers.  - instructed on bleeding/infection precautions and pelvic rest at least 2 weeks after pregnancy passes

## 2017-10-06 NOTE — ASSESSMENT & PLAN NOTE
- symptoms improved with antiemetics and fluids in ED  - patient feels better and that she can tolerate PO  - recommend small frequent meals and PO hydration as tolerated  - can continue antiemetics at home PRN

## 2017-10-06 NOTE — ED NOTES
Pt lying in bed at 90 degrees. Pt states pain level 5/10. Pt c/o abd discomfort, denies n/v. Restroom and comfort needs addressed. Vs monitoring in progress. Call light within reach. Will continue to monitor.

## 2017-10-06 NOTE — ED NOTES
Pt lying in bed. Pt states pain level 4/10.  at bedside. Restroom and comfort needs addressed. VS monitoring in progress. Call light within reach. Will continue to monitor.

## 2017-10-06 NOTE — SUBJECTIVE & OBJECTIVE
Obstetric History       T0      L1     SAB0   TAB0   Ectopic0   Multiple0   Live Births1       # Outcome Date GA Lbr Armando/2nd Weight Sex Delivery Anes PTL Lv   3 Current            2  10/09/16 31w3d  1.726 kg (3 lb 12.9 oz) M CS-LTranv EPI Y GURMEET      Name: ADRIANA LEONARDO      Complications: Abruptio Placenta      Apgar1:  7                Apgar5: 8   1 AB                 Past Medical History:   Diagnosis Date    Asthma     Genital herpes      Past Surgical History:   Procedure Laterality Date     SECTION      WISDOM TOOTH EXTRACTION           (Not in a hospital admission)    Review of patient's allergies indicates:  No Known Allergies     Family History     None        Social History Main Topics    Smoking status: Former Smoker    Smokeless tobacco: Never Used    Alcohol use No    Drug use: No    Sexual activity: Yes     Partners: Male     Birth control/ protection: None      Comment: - Da      Review of Systems   Constitutional: Negative for chills and fever.   Eyes: Negative for visual disturbance.   Respiratory: Negative for shortness of breath.    Cardiovascular: Negative for chest pain and palpitations.   Gastrointestinal: Positive for nausea and vomiting. Negative for abdominal pain.   Genitourinary: Positive for vaginal bleeding. Negative for vaginal discharge and vaginal odor.   Musculoskeletal: Negative for back pain.   Neurological: Negative for seizures, syncope and headaches.   Hematological: Does not bruise/bleed easily.   Psychiatric/Behavioral: The patient is not nervous/anxious.       Objective:     Vital Signs (Most Recent):  Temp: 98.4 °F (36.9 °C) (10/05/17 2236)  Pulse: 70 (10/05/17 2135)  Resp: 16 (10/05/17 1839)  BP: 112/61 (10/05/17 2135)  SpO2: 96 % (10/05/17 2135) Vital Signs (24h Range):  Temp:  [98 °F (36.7 °C)-98.4 °F (36.9 °C)] 98.4 °F (36.9 °C)  Pulse:  [70-79] 70  Resp:  [16] 16  SpO2:  [96 %-100 %] 96 %  BP: (112-142)/(61-69)  112/61     Weight: 73.9 kg (163 lb)  Body mass index is 24.07 kg/m².    Physical Exam:   Constitutional: She is oriented to person, place, and time. She appears well-developed and well-nourished. No distress.       Cardiovascular: Normal rate and regular rhythm.     Pulmonary/Chest: Effort normal. No respiratory distress.        Abdominal: Soft. She exhibits no distension. There is no tenderness. There is no rebound and no guarding.             Musculoskeletal: Moves all extremeties. She exhibits no edema.       Neurological: She is alert and oriented to person, place, and time.    Skin: Skin is warm and dry.    Psychiatric: She has a normal mood and affect. Her behavior is normal.     Significant Labs:      Recent Labs  Lab 10/05/17  1950   WBC 7.27   HGB 12.9   HCT 37.8   MCV 81*           Recent Labs  Lab 10/05/17  1950      K 3.6      CO2 19*   BUN 9   CREATININE 0.7   GLU 82     bHCG 85193 today    A POS    UA with trace protein and blood, 3+ ketones    Imaging:  Imaging Results          US OB Less Than 14 Wks with Transvag(xpd (Final result)  Result time 10/05/17 19:56:56    Final result by Mila Courtney MD (10/05/17 19:56:56)                 Impression:      1.  Fluid collection within the endometrium, with suggestion of minimal surrounding trophoblastic reaction and probable intraluminal yolk sac however no fetal pole at this time.  If this indeed reflects a gestational sac, mean gestational sac diameter correlates with an estimated gestational age of 6 weeks 5 days.  Given the appearance, examination is consistent with pregnancy of unknown viability, early pregnancy failure is not excluded, followup beta-hCG and/or ultrasound is recommended.  Please note, there is a mild to moderate subchorionic hemorrhage.          Electronically signed by: MILA COURTNEY MD  Date:     10/05/17  Time:    19:56              Narrative:    Ultrasound OB less than 14 weeks with transvaginal    Clinical  history: Vaginal bleeding    Comparison: None    Technique:  Real-time sonography was performed of the pelvis using transabdominal and transvaginal technique.    Findings:      There is a low attenuating fluid collection with suggested minimal surrounding trophoblastic reaction within the uterus, mean gestational sac diameter would correlate with an estimated gestational age of 6 weeks 5 days.  On cine imaging there is what appears to be a thin walled yolk sac although no convincing fetal pole at this time.  There is a mild to moderate subchorionic hemorrhage.  There is a mild amount of fluid within the cervix.  The right ovary measures approximately 1.7 x 2.1 x 3.6 cm, and contains a probable corpus luteal cyst measuring 1.6 x 1.3 x 1.5 cm.  The left ovary measures approximately 1.1 x 1.8 x 1.9 cm.  Arterial and venous flow is documented to the ovaries bilaterally.  No significant free fluid in the pelvis.

## 2017-10-06 NOTE — ED PROVIDER NOTES
Encounter Date: 10/5/2017       History     Chief Complaint   Patient presents with    Emesis     pt c/o vomiting all day and not being able to keep anything down, pt reports she is apx 12 wks pregnant      26-year-old female with history of asthma in general herpes presents emergency department with complaints of nausea and vomiting.  She states that she is supposedly approximately 12 weeks gestation.  She does admit that she had ultrasound obtained at her last OB/GYN visit and was told that she was possibly miscarrying.  She states that she's had persistent nausea and vomiting without any relief with Zofran at home.  She denies any abdominal pain and cramping.  She reports vaginal spotting but no significant bleeding.  She denies any urinary symptoms.  She denies vaginal discharge or pelvic pain.      The history is provided by the patient and the spouse.     Review of patient's allergies indicates:  No Known Allergies  Past Medical History:   Diagnosis Date    Asthma     Genital herpes      Past Surgical History:   Procedure Laterality Date     SECTION      WISDOM TOOTH EXTRACTION       Family History   Problem Relation Age of Onset    Breast cancer Neg Hx     Colon cancer Neg Hx     Ovarian cancer Neg Hx      Social History   Substance Use Topics    Smoking status: Former Smoker    Smokeless tobacco: Never Used    Alcohol use No     Review of Systems   Constitutional: Negative for chills and fever.   HENT: Negative for sore throat.    Respiratory: Negative for shortness of breath.    Cardiovascular: Negative for chest pain.   Gastrointestinal: Positive for nausea and vomiting. Negative for abdominal pain and diarrhea.   Genitourinary: Negative for difficulty urinating, dysuria, flank pain, frequency, hematuria, urgency, vaginal bleeding and vaginal discharge.   Musculoskeletal: Negative for back pain.   Skin: Negative for rash.   Neurological: Negative for weakness.   Hematological: Does not  bruise/bleed easily.       Physical Exam     Initial Vitals [10/05/17 1839]   BP Pulse Resp Temp SpO2   (!) 142/69 79 16 98 °F (36.7 °C) 100 %      MAP       93.33         Physical Exam    Nursing note and vitals reviewed.  Constitutional: She appears well-developed and well-nourished. She is not diaphoretic. She is Obese .  Non-toxic appearance. No distress.   HENT:   Head: Normocephalic and atraumatic.   Right Ear: External ear normal.   Left Ear: External ear normal.   Nose: Nose normal.   Mouth/Throat: Oropharynx is clear and moist.   Eyes: Conjunctivae, EOM and lids are normal. Pupils are equal, round, and reactive to light. No scleral icterus.   Neck: Normal range of motion and phonation normal. Neck supple.   Cardiovascular: Normal rate, regular rhythm and normal heart sounds. Exam reveals no gallop and no friction rub.    No murmur heard.  Pulmonary/Chest: Effort normal and breath sounds normal. No respiratory distress. She has no decreased breath sounds. She has no wheezes. She has no rhonchi. She has no rales.   Abdominal: Soft. Normal appearance and bowel sounds are normal. She exhibits no distension. There is no tenderness. There is no rigidity, no rebound, no guarding, no CVA tenderness, no tenderness at McBurney's point and negative Espinal's sign.   Musculoskeletal: Normal range of motion.   No obvious deformities, moving all extremities, normal gait   Neurological: She is alert and oriented to person, place, and time. She has normal strength and normal reflexes. No sensory deficit.   Skin: Skin is warm, dry and intact. No lesion and no rash noted. No erythema.   Psychiatric: She has a normal mood and affect. Her speech is normal and behavior is normal. Judgment normal. Cognition and memory are normal.         ED Course   Procedures  Labs Reviewed   CBC W/ AUTO DIFFERENTIAL - Abnormal; Notable for the following:        Result Value    MCV 81 (*)     All other components within normal limits    URINALYSIS - Abnormal; Notable for the following:     Specific Gravity, UA >=1.030 (*)     Protein, UA Trace (*)     Ketones, UA 3+ (*)     Occult Blood UA 1+ (*)     All other components within normal limits   POCT URINE PREGNANCY - Abnormal; Notable for the following:     POC Preg Test, Ur Positive (*)     All other components within normal limits   URINALYSIS MICROSCOPIC   BASIC METABOLIC PANEL   HCG, QUANTITATIVE, PREGNANCY        Imaging Results          US OB Less Than 14 Wks with Transvag(xpd (Final result)  Result time 10/05/17 19:56:56    Final result by Mila Courtney MD (10/05/17 19:56:56)                 Impression:      1.  Fluid collection within the endometrium, with suggestion of minimal surrounding trophoblastic reaction and probable intraluminal yolk sac however no fetal pole at this time.  If this indeed reflects a gestational sac, mean gestational sac diameter correlates with an estimated gestational age of 6 weeks 5 days.  Given the appearance, examination is consistent with pregnancy of unknown viability, early pregnancy failure is not excluded, followup beta-hCG and/or ultrasound is recommended.  Please note, there is a mild to moderate subchorionic hemorrhage.          Electronically signed by: MILA COURTNEY MD  Date:     10/05/17  Time:    19:56              Narrative:    Ultrasound OB less than 14 weeks with transvaginal    Clinical history: Vaginal bleeding    Comparison: None    Technique:  Real-time sonography was performed of the pelvis using transabdominal and transvaginal technique.    Findings:      There is a low attenuating fluid collection with suggested minimal surrounding trophoblastic reaction within the uterus, mean gestational sac diameter would correlate with an estimated gestational age of 6 weeks 5 days.  On cine imaging there is what appears to be a thin walled yolk sac although no convincing fetal pole at this time.  There is a mild to moderate subchorionic  hemorrhage.  There is a mild amount of fluid within the cervix.  The right ovary measures approximately 1.7 x 2.1 x 3.6 cm, and contains a probable corpus luteal cyst measuring 1.6 x 1.3 x 1.5 cm.  The left ovary measures approximately 1.1 x 1.8 x 1.9 cm.  Arterial and venous flow is documented to the ovaries bilaterally.  No significant free fluid in the pelvis.                                 Medical Decision Making:   History:   I obtained history from: someone other than patient.       <> Summary of History:   Old Medical Records: I decided to obtain old medical records.  Initial Assessment:   26-year-old female with complaints consistent with nausea and vomiting with dehydration and pregnancy with concerns for fetal demise in early pregnancy.  Abdomen is soft and nontender with no evidence of acute or surgical abdomen.  No CVA tenderness palpation.  She denies any vaginal bleeding or pain.  She does report persistent nausea and vomiting.  Afebrile and neurovascularly intact.  Vital signs stable.  Clinical Tests:   Lab Tests: Ordered and Reviewed  Radiological Study: Ordered and Reviewed  ED Management:  Workup obtained due to with seems to be significantly elevated beta hCG with concerns for abnormal ultrasound 3 weeks ago.  She was administered IV fluids, Reglan and Pepcid in the emergency department. She does have ketones in her urine consistent with dehydration.  Ultrasound consistent with a fluid collection within the endometrium with suggestion of minimal surrounding trophoblastic reaction and probable intraluminal yolk sac however no fetal pole at this time.  Reflects a gestational sac with mean gestational sac diameter correlating to approximately 6 weeks 5 days gestational age.  According to radiology, exam is consistent with pregnancy of unknown viability/early pregnancy failure cannot be excluded.  Pending beta hCG at this time.  Previous beta hCG was greater than 49,000.  8:51 PM discussed  with Dr. Marino, GYN intern on-call.  Will come down to evaluate the patient and discuss treatment options.  This patient was discussed with the attending physician who will resume care from here.  Other:   I have discussed this case with another health care provider.       <> Summary of the Discussion: Carlos Alberto  This note was created using Dragon Medical dictation.  There may be typographical errors secondary to dictation.                     ED Course      Clinical Impression:     1. Non-intractable vomiting with nausea, unspecified vomiting type    2. Dehydration    3. Abnormal pregnancy in first trimester                                 Sona Jett PA-C  10/05/17 4042

## 2017-10-10 ENCOUNTER — HOSPITAL ENCOUNTER (EMERGENCY)
Facility: HOSPITAL | Age: 27
Discharge: HOME | End: 2017-10-10
Attending: EMERGENCY MEDICINE
Payer: OTHER GOVERNMENT

## 2017-10-10 VITALS
HEART RATE: 72 BPM | WEIGHT: 163 LBS | TEMPERATURE: 99 F | RESPIRATION RATE: 20 BRPM | SYSTOLIC BLOOD PRESSURE: 114 MMHG | BODY MASS INDEX: 24.14 KG/M2 | DIASTOLIC BLOOD PRESSURE: 60 MMHG | OXYGEN SATURATION: 99 % | HEIGHT: 69 IN

## 2017-10-10 DIAGNOSIS — O03.9 SPONTANEOUS ABORTION: Primary | ICD-10-CM

## 2017-10-10 DIAGNOSIS — R55 SYNCOPE, UNSPECIFIED SYNCOPE TYPE: ICD-10-CM

## 2017-10-10 DIAGNOSIS — R55 SYNCOPE: ICD-10-CM

## 2017-10-10 DIAGNOSIS — D64.9 ANEMIA, UNSPECIFIED TYPE: ICD-10-CM

## 2017-10-10 LAB
BASOPHILS # BLD AUTO: 0.03 K/UL
BASOPHILS NFR BLD: 0.3 %
DIFFERENTIAL METHOD: ABNORMAL
EOSINOPHIL # BLD AUTO: 0.3 K/UL
EOSINOPHIL NFR BLD: 3.8 %
ERYTHROCYTE [DISTWIDTH] IN BLOOD BY AUTOMATED COUNT: 14.1 %
HCT VFR BLD AUTO: 29.8 %
HGB BLD-MCNC: 10 G/DL
LYMPHOCYTES # BLD AUTO: 1.8 K/UL
LYMPHOCYTES NFR BLD: 21 %
MCH RBC QN AUTO: 27.5 PG
MCHC RBC AUTO-ENTMCNC: 33.6 G/DL
MCV RBC AUTO: 82 FL
MONOCYTES # BLD AUTO: 0.4 K/UL
MONOCYTES NFR BLD: 4.6 %
NEUTROPHILS # BLD AUTO: 6.2 K/UL
NEUTROPHILS NFR BLD: 70.1 %
PLATELET # BLD AUTO: 263 K/UL
PMV BLD AUTO: 11.1 FL
RBC # BLD AUTO: 3.63 M/UL
WBC # BLD AUTO: 8.78 K/UL

## 2017-10-10 PROCEDURE — 93010 ELECTROCARDIOGRAM REPORT: CPT | Mod: ,,, | Performed by: INTERNAL MEDICINE

## 2017-10-10 PROCEDURE — 93005 ELECTROCARDIOGRAM TRACING: CPT

## 2017-10-10 PROCEDURE — 25000003 PHARM REV CODE 250: Performed by: EMERGENCY MEDICINE

## 2017-10-10 PROCEDURE — 96360 HYDRATION IV INFUSION INIT: CPT

## 2017-10-10 PROCEDURE — 85025 COMPLETE CBC W/AUTO DIFF WBC: CPT

## 2017-10-10 PROCEDURE — 99284 EMERGENCY DEPT VISIT MOD MDM: CPT | Mod: 25

## 2017-10-10 RX ORDER — IBUPROFEN 600 MG/1
600 TABLET ORAL EVERY 6 HOURS PRN
Qty: 20 TABLET | Refills: 0 | Status: SHIPPED | OUTPATIENT
Start: 2017-10-10

## 2017-10-10 RX ADMIN — SODIUM CHLORIDE 1000 ML: 0.9 INJECTION, SOLUTION INTRAVENOUS at 10:10

## 2017-10-10 NOTE — ED PROVIDER NOTES
"Encounter Date: 10/10/2017    SCRIBE #1 NOTE: I, Gertrudis Rona, am scribing for, and in the presence of,  Anai Matson PA-C. I have scribed the following portions of the note - Other sections scribed: ROS and HPI.       History     Chief Complaint   Patient presents with    Loss of Consciousness     " I keep on passing out like 3 times yesterday. I'm having a miscarriage."     Female  Problem     " I'm having a miscarriage but I'm bleeding a lot like bigger than the size of golf balls."      CC: Loss of Consciousness; Female  problem    HPI: This 26 y.o. female with  a past medical history of Asthma and Genital herpes, presents to the ED complaining of feeling weak and lightheaded since yesterday. She notes she almost "passed out" 3 times and was sweating profusely last night. She states she is having a miscarriage at "12 weeks gestation" and her vaginal bleeding started back yesterday at 7 PM. She was having only mild vaginal spotting before.  Bleeding has been heavy and constant  with "bigger" clots since yesterday. She notes abdominal cramps yesterday but currently denies any pain. Prior US on 10/05/2017 showed 6 weeks 5 days gestational sac with no fetal pole. The patient denies any medication intake to assist her with miscarriage. No other associated sx are reported. Sx has progressively worsened and now constant. No exacerbating or alleviating factors are reported. No prior medical intervention.       The history is provided by the patient. No  was used.     Review of patient's allergies indicates:  No Known Allergies  Past Medical History:   Diagnosis Date    Asthma     Genital herpes      Past Surgical History:   Procedure Laterality Date     SECTION      WISDOM TOOTH EXTRACTION       Family History   Problem Relation Age of Onset    Breast cancer Neg Hx     Colon cancer Neg Hx     Ovarian cancer Neg Hx      Social History   Substance Use Topics    Smoking status: " "Former Smoker    Smokeless tobacco: Never Used    Alcohol use No     Review of Systems   Constitutional: Positive for fatigue.   HENT: Negative for congestion.    Eyes: Negative for pain.   Respiratory: Negative for cough and shortness of breath.    Cardiovascular: Negative for chest pain.   Gastrointestinal: Negative for abdominal pain, diarrhea, nausea and vomiting.   Endocrine: Negative for polydipsia.   Genitourinary: Positive for vaginal bleeding. Negative for dysuria, frequency and pelvic pain.   Musculoskeletal: Negative for back pain.   Neurological: Positive for weakness (generalized), light-headedness and headaches.        (+) "passing out"   Psychiatric/Behavioral: Negative for behavioral problems.   All other systems reviewed and are negative.      Physical Exam     Initial Vitals [10/10/17 0916]   BP Pulse Resp Temp SpO2   118/75 82 16 -- 98 %      MAP       89.33         Physical Exam    Nursing note and vitals reviewed.  Constitutional: She appears well-developed and well-nourished. She is not diaphoretic. No distress.   HENT:   Head: Normocephalic and atraumatic.   Nose: Nose normal.   Eyes: EOM are normal. Pupils are equal, round, and reactive to light.   Neck: Normal range of motion. Neck supple.   Cardiovascular: Normal rate and regular rhythm.   No murmur heard.  Pulmonary/Chest: Breath sounds normal. No respiratory distress. She has no wheezes. She has no rhonchi. She has no rales.   Abdominal: Soft. Bowel sounds are normal. She exhibits no distension. There is no tenderness. There is no rebound and no guarding.   Genitourinary:   Genitourinary Comments: There is a scant amount of vaginal blood in the vaginal vault.  The cervix is 1cm dilated.  There is no CMT or adnexal tenderness to palpation.   Musculoskeletal: Normal range of motion. She exhibits no edema or tenderness.   Neurological: She is alert and oriented to person, place, and time. No cranial nerve deficit.   Skin: Skin is warm. No " rash noted. No erythema.         ED Course   Procedures  Labs Reviewed   CBC W/ AUTO DIFFERENTIAL - Abnormal; Notable for the following:        Result Value    RBC 3.63 (*)     Hemoglobin 10.0 (*)     Hematocrit 29.8 (*)     All other components within normal limits     EKG Readings: (Independently Interpreted)   Initial Reading: No STEMI. Heart Rate: 70. Ectopy: No Ectopy. Axis: Normal.          Medical Decision Making:   Differential Diagnosis:   This is an urgent evaluation of a 26 year female presents to the emergency department complaining of vaginal bleeding.  She states she is approximately 12 weeks pregnant.  She went saw her OB/GYN several weeks ago and was told that she to blighted ovum.  She was given medication to help induce an  but never took it.  The bleeding started last night.  She reported some cramping yesterday with bleeding however does since resolved.    Previous medical records were obtained and reviewed.  This patient has a history of genital herpes and asthma.    The patient is currently afebrile and nontoxic in appearance.  Vital signs are stable.  On physical exam, there is no abdominal tenderness that could be elicited.  On pelvic exam, there is a scant amount of vaginal blood in the vaginal vault.  The cervix is 1 cm dilated.  There is no hemorrhaging or clots noted.  There is no cervical motion or adnexal tenderness to palpation.  The remaining physical exam is unremarkable.  A CBC was performed which revealed an H&H of 10 and 29.  Previous labs were reviewed.  This patient's beta hCG is noted to have decreased about 10,000.  There is no indication that this patient needs a blood transfusion at this time.  An EKG was performed which revealed a ventricular rate of 70.  There was normal sinus rhythm without evidence of STEMI.  Signs and symptoms of worsening bleeding were thoroughly discussed with the patient and she verbalized understanding and agreement.  She'll need to  follow-up with her OB/GYN.  She stable for discharge at this time.  This case was discussed with Dr. Rordiguez and he is in agreement with the assessment and treatment plan.            Scribe Attestation:   Scribe #1: I performed the above scribed service and the documentation accurately describes the services I performed. I attest to the accuracy of the note.    Attending Attestation:     Physician Attestation Statement for NP/PA:   I discussed this assessment and plan of this patient with the NP/PA, but I did not personally examine the patient. The face to face encounter was performed by the NP/PA.        Physician Attestation for Scribe:  Physician Attestation Statement for Scribe #1: I, Anai Matson PA-C, reviewed documentation, as scribed by Gertrudis Rea in my presence, and it is both accurate and complete.                 ED Course      Clinical Impression:   The primary encounter diagnosis was Spontaneous . Diagnoses of Syncope, Anemia, unspecified type, and Syncope, unspecified syncope type were also pertinent to this visit.    Disposition:   Disposition: Discharged  Condition: Stable                        Anai Matson PA-C  10/10/17 1227       Anai Matson PA-C  10/10/17 1230       Jeevan Rodriguez MD  10/10/17 1537

## 2017-10-10 NOTE — ED TRIAGE NOTES
Pt states she is having vaginal bleeding and large clots since yesterday, pt states she was told weeks ago she was going to miscarry. LOC x3 since last night. Denies hitting her head on anything.

## 2017-10-10 NOTE — DISCHARGE INSTRUCTIONS
Please follow-up with her OB/GYN.  Rest.  Return to the emergency department for bleeding greater than 1 pad per hour, syncope or any other concerning symptoms.  Drink plenty of fluids.

## 2017-10-18 ENCOUNTER — TELEPHONE (OUTPATIENT)
Dept: OBSTETRICS AND GYNECOLOGY | Facility: HOSPITAL | Age: 27
End: 2017-10-18

## 2017-10-18 NOTE — TELEPHONE ENCOUNTER
Called and spoke to Mica Anderson about weekly beta hcg blood draws secondary to blighted ovum. Patient has not recently had her lab work done. Her last beta hcg level was >30,000 on 10/6/17. Patient denied taking prescribed cytotec. Patient says she passed the POC on her own at home the next night. I stressed the importance of having weekly lab work done and trending the beta hcg levels down to <5. Patient verbalized understanding and stated they will follow up with their lab work. I instructed the patient that I would call back next week to follow up.

## 2017-11-06 ENCOUNTER — TELEPHONE (OUTPATIENT)
Dept: OBSTETRICS AND GYNECOLOGY | Facility: HOSPITAL | Age: 27
End: 2017-11-06

## 2017-11-06 NOTE — TELEPHONE ENCOUNTER
Called and spoke to Mica Anderson about weekly beta hcg blood draws secondary to spontaneous AB. Patient has not recently had her lab work done. Her last beta hcg level was > 30,000  on 10/5/17. I stressed the importance of having weekly lab work done and trending the beta hcg levels down to <5. Patient verbalized understanding and stated they will follow up with their lab work today after apt with Dr. Vilchis. I instructed the patient that I would call back next week to follow up.

## 2017-12-29 ENCOUNTER — TELEPHONE (OUTPATIENT)
Dept: OBSTETRICS AND GYNECOLOGY | Facility: HOSPITAL | Age: 27
End: 2017-12-29

## 2017-12-29 NOTE — TELEPHONE ENCOUNTER
Called patient to discuss weekly beta hcg blood draws secondary to spontaneous AB. Patient has not recently had her lab work done. Her last beta hcg level was 392 on 11/6/17. I stressed the importance of having weekly lab work done and trending the beta hcg levels down to <5. Patient verbalized understanding and stated she will [facility] where lab is ordered to get lab drawn.  Sushma K Reddy  PGY-1, OBGYN

## 2018-01-22 ENCOUNTER — TELEPHONE (OUTPATIENT)
Dept: OBSTETRICS AND GYNECOLOGY | Facility: HOSPITAL | Age: 28
End: 2018-01-22

## 2018-01-22 DIAGNOSIS — O03.9 SPONTANEOUS ABORTION: Primary | ICD-10-CM

## 2018-01-22 NOTE — TELEPHONE ENCOUNTER
Patient called to discuss b-hCG. She reports that she has not followed up because she has been living in [place]. She will be back in [place] this week and agrees to have lab draw while in town.   She is asymptomatic.     Patrick Boo M.D.  Obstetrics & Gynecology  PGY-1

## 2018-02-08 ENCOUNTER — TELEPHONE (OUTPATIENT)
Dept: OBSTETRICS AND GYNECOLOGY | Facility: HOSPITAL | Age: 28
End: 2018-02-08

## 2018-02-08 NOTE — TELEPHONE ENCOUNTER
Five attempts to contact the patient over the past several months - Patient has not had repeat beta-hCG lab done. Last was 392 on 11/6/17. Will send a certified letter and stop further efforts to contact.     Patrick Boo M.D.  Obstetrics & Gynecology  PGY-1